# Patient Record
Sex: FEMALE | Race: BLACK OR AFRICAN AMERICAN | NOT HISPANIC OR LATINO | Employment: FULL TIME | URBAN - METROPOLITAN AREA
[De-identification: names, ages, dates, MRNs, and addresses within clinical notes are randomized per-mention and may not be internally consistent; named-entity substitution may affect disease eponyms.]

---

## 2023-11-01 PROBLEM — C78.7 COLON CANCER METASTASIZED TO LIVER (MULTI): Status: ACTIVE | Noted: 2023-11-01

## 2023-11-01 PROBLEM — K56.600 PARTIAL OBSTRUCTION OF SMALL INTESTINE (MULTI): Status: ACTIVE | Noted: 2023-11-01

## 2023-11-01 PROBLEM — K63.89 CECUM MASS: Status: ACTIVE | Noted: 2023-11-01

## 2023-11-01 PROBLEM — Z45.2 PIC LINE (PERIPHERALLY INSERTED CENTRAL CATHETER) REMOVAL: Status: ACTIVE | Noted: 2023-11-01

## 2023-11-01 PROBLEM — C18.9 COLON CANCER METASTASIZED TO LIVER (MULTI): Status: ACTIVE | Noted: 2023-11-01

## 2023-11-01 PROBLEM — Z93.1 STATUS POST INSERTION OF PERCUTANEOUS ENDOSCOPIC GASTROSTOMY (PEG) TUBE (MULTI): Status: ACTIVE | Noted: 2023-11-01

## 2023-11-01 RX ORDER — DULAGLUTIDE 0.75 MG/.5ML
INJECTION, SOLUTION SUBCUTANEOUS
COMMUNITY
End: 2024-05-07 | Stop reason: WASHOUT

## 2023-11-01 RX ORDER — TRAMADOL HYDROCHLORIDE 50 MG/1
TABLET ORAL
COMMUNITY
Start: 2020-07-15 | End: 2024-05-07 | Stop reason: WASHOUT

## 2023-11-01 RX ORDER — POLYETHYLENE GLYCOL 3350, SODIUM CHLORIDE, SODIUM BICARBONATE, POTASSIUM CHLORIDE 420; 11.2; 5.72; 1.48 G/4L; G/4L; G/4L; G/4L
POWDER, FOR SOLUTION ORAL
COMMUNITY
Start: 2022-01-11

## 2023-11-01 RX ORDER — ACETAMINOPHEN 325 MG/1
TABLET ORAL
COMMUNITY
Start: 2018-11-27

## 2023-11-02 ENCOUNTER — ANCILLARY PROCEDURE (OUTPATIENT)
Dept: RADIOLOGY | Facility: CLINIC | Age: 68
End: 2023-11-02
Payer: MEDICARE

## 2023-11-02 DIAGNOSIS — C18.9 MALIGNANT NEOPLASM OF COLON, UNSPECIFIED (MULTI): Primary | ICD-10-CM

## 2023-11-02 PROCEDURE — 82378 CARCINOEMBRYONIC ANTIGEN: CPT | Performed by: INTERNAL MEDICINE

## 2023-11-02 PROCEDURE — 71260 CT THORAX DX C+: CPT | Performed by: STUDENT IN AN ORGANIZED HEALTH CARE EDUCATION/TRAINING PROGRAM

## 2023-11-02 PROCEDURE — 74177 CT ABD & PELVIS W/CONTRAST: CPT

## 2023-11-02 PROCEDURE — 74177 CT ABD & PELVIS W/CONTRAST: CPT | Performed by: STUDENT IN AN ORGANIZED HEALTH CARE EDUCATION/TRAINING PROGRAM

## 2023-11-02 PROCEDURE — 2550000001 HC RX 255 CONTRASTS: Performed by: INTERNAL MEDICINE

## 2023-11-02 PROCEDURE — 82565 ASSAY OF CREATININE: CPT | Performed by: INTERNAL MEDICINE

## 2023-11-02 RX ADMIN — IOHEXOL 75 ML: 350 INJECTION, SOLUTION INTRAVENOUS at 11:34

## 2023-11-07 ENCOUNTER — OFFICE VISIT (OUTPATIENT)
Dept: HEMATOLOGY/ONCOLOGY | Facility: CLINIC | Age: 68
End: 2023-11-07
Payer: MEDICARE

## 2023-11-07 VITALS
BODY MASS INDEX: 41.57 KG/M2 | HEART RATE: 74 BPM | SYSTOLIC BLOOD PRESSURE: 124 MMHG | DIASTOLIC BLOOD PRESSURE: 75 MMHG | WEIGHT: 249.78 LBS | TEMPERATURE: 97.3 F | OXYGEN SATURATION: 96 % | RESPIRATION RATE: 18 BRPM

## 2023-11-07 DIAGNOSIS — C18.9 COLON CANCER METASTASIZED TO LIVER (MULTI): Primary | ICD-10-CM

## 2023-11-07 DIAGNOSIS — C78.7 COLON CANCER METASTASIZED TO LIVER (MULTI): Primary | ICD-10-CM

## 2023-11-07 PROCEDURE — 99213 OFFICE O/P EST LOW 20 MIN: CPT | Performed by: INTERNAL MEDICINE

## 2023-11-07 PROCEDURE — 1036F TOBACCO NON-USER: CPT | Performed by: INTERNAL MEDICINE

## 2023-11-07 PROCEDURE — 1126F AMNT PAIN NOTED NONE PRSNT: CPT | Performed by: INTERNAL MEDICINE

## 2023-11-07 ASSESSMENT — ENCOUNTER SYMPTOMS
DEPRESSION: 0
OCCASIONAL FEELINGS OF UNSTEADINESS: 0
LOSS OF SENSATION IN FEET: 0

## 2023-11-07 ASSESSMENT — PAIN SCALES - GENERAL: PAINLEVEL: 0-NO PAIN

## 2023-11-07 NOTE — PROGRESS NOTES
Patient ID: Mabel Cardoso is a 68 y.o. female from [place], [place].     Referring Physician: No referring provider defined for this encounter.    Primary Care Provider: Donna Leos    Cancer History:   Treatment Synopsis:    DIAGNOSIS  5/18/19 T3N0M0 right-sided colon cancer, s/p resection  11/2019 Liver metastasis bx proven  6/2020- LIver bx of smaller lesion negative  6/30/2020 R lobe hepatectomy     CURRENT SITES OF DISEASE  Liver- > BECKIE      MOLECULAR GENOMICS:  MICROSATELLITE STATUS: Indeterminate   DISEASE ASSOCIATED GENOMIC FINDINGS:   TP53 p.R248W ([id number]: c.742C>T)       DISEASERELEVANT ALTERATIONS NOT DETECTED:    Negative for BRAF V600E.   Negative for KRAS mutation.   Negative for NRAS mutation.        SERUM TUMOR MARKER  CEA low at dx  6.9 at recurrence 10/2019  3.0 1/2020     CURRENT THERAPY  Surveillance    PRIOR THERAPY  R. Hemicolectomy 5/2018  Developed liver met 11/19: 12/19-4/2020cape/ox, bevacizumab - oxaliplatin / jarad held starting 4/28/20 -      Liver resection 6/30/20          Past Medical History: Mabel has a past medical history of Other specified health status, Personal history of other diseases of the circulatory system (07/15/2020), and Personal history of other endocrine, nutritional and metabolic disease (07/15/2020).  Surgical History:  Mabel has a past surgical history that includes Breast surgery (04/09/2018); Hysterectomy (04/09/2018); Knee surgery (04/09/2018); Other surgical history (07/15/2020); US guided needle liver biopsy (10/18/2019); and US guided needle liver biopsy (6/5/2020).  Social History:  Mabel reports that she has never smoked. She has never used smokeless tobacco. She reports that she does not currently use alcohol. She reports that she does not use drugs.  Family History:    Family History   Problem Relation Name Age of Onset    Diabetes Mother      Hypertension Mother      Lung cancer Mother      Lung cancer Father       Family Oncology  History:  Cancer-related family history includes Lung cancer in her father and mother.        SUBJECTIVE:    History of Present Illness:  Mabel Cardoso is a 68 y.o. female who was referred by No ref. provider found and presents with Follow-up    Feeling good.    Down 10 lbs on ozempic in last 6months.    She is wondering about if she can come of Farxiga.      OBJECTIVE:    VS / Pain:  /75 (BP Location: Left arm, Patient Position: Sitting, BP Cuff Size: Adult)   Pulse 74   Temp 36.3 °C (97.3 °F) (Temporal)   Resp 18   Wt 113 kg (249 lb 12.5 oz)   SpO2 96%   BMI 41.57 kg/m²   BSA: 2.28 meters squared   Pain Scale: 0    Daily Weight  11/07/23 : 113 kg (249 lb 12.5 oz)  03/03/23 : 117 kg (258 lb 1.6 oz)  09/20/22 : 128 kg (282 lb 6.6 oz)      Physical Exam  Constitutional:       General: She is not in acute distress.     Appearance: She is obese.   HENT:      Head: Normocephalic.      Mouth/Throat:      Mouth: Mucous membranes are moist.   Eyes:      Pupils: Pupils are equal, round, and reactive to light.   Cardiovascular:      Rate and Rhythm: Normal rate.   Pulmonary:      Effort: Pulmonary effort is normal.   Abdominal:      General: Abdomen is flat.   Musculoskeletal:         General: Normal range of motion.      Cervical back: Normal range of motion.   Skin:     General: Skin is warm.   Neurological:      General: No focal deficit present.      Mental Status: She is alert. Mental status is at baseline.   Psychiatric:         Mood and Affect: Mood normal.         Thought Content: Thought content normal.         Performance Status:   Ecog 1    Diagnostic Results         WBC   Date/Time Value Ref Range Status   11/02/2023 10:27 AM 11.6 (H) 4.4 - 11.3 x10*3/uL Final   07/14/2023 04:13 PM 11.8 (H) 4.4 - 11.3 x10E9/L Final   01/18/2023 04:16 PM 13.2 (H) 4.4 - 11.3 x10E9/L Final   09/16/2022 10:26 AM 9.0 4.4 - 11.3 x10E9/L Final     Hemoglobin   Date Value Ref Range Status   11/02/2023 13.8 12.0 - 16.0  g/dL Final   07/14/2023 14.3 12.0 - 16.0 g/dL Final   01/18/2023 14.0 12.0 - 16.0 g/dL Final   09/16/2022 13.8 12.0 - 16.0 g/dL Final     MCV   Date/Time Value Ref Range Status   11/02/2023 10:27 AM 94 80 - 100 fL Final   07/14/2023 04:13 PM 89 80 - 100 fL Final   01/18/2023 04:16 PM 90 80 - 100 fL Final   09/16/2022 10:26 AM 90 80 - 100 fL Final     Platelets   Date/Time Value Ref Range Status   11/02/2023 10:27  150 - 450 x10*3/uL Final   07/14/2023 04:13  150 - 450 x10E9/L Final   01/18/2023 04:16  150 - 450 x10E9/L Final   09/16/2022 10:26  150 - 450 x10E9/L Final     Neutrophils Absolute   Date/Time Value Ref Range Status   11/02/2023 10:27 AM 6.06 1.20 - 7.70 x10*3/uL Final     Comment:     Percent differential counts (%) should be interpreted in the context of the absolute cell counts (cells/uL).   07/14/2023 04:13 PM 6.05 1.20 - 7.70 x10E9/L Final     Comment:      Percent differential counts (%) should be interpreted in the   context of the absolute cell counts (cells/L).     01/18/2023 04:16 PM 7.18 1.20 - 7.70 x10E9/L Final     Comment:      Percent differential counts (%) should be interpreted in the   context of the absolute cell counts (cells/L).     09/16/2022 10:26 AM 4.92 1.20 - 7.70 x10E9/L Final     Comment:      Percent differential counts (%) should be interpreted in the   context of the absolute cell counts (cells/L).       Bilirubin, Total   Date/Time Value Ref Range Status   11/02/2023 10:27 AM 0.5 0.0 - 1.2 mg/dL Final     Total Bilirubin   Date/Time Value Ref Range Status   07/14/2023 04:13 PM 0.5 0.0 - 1.2 mg/dL Final   01/18/2023 04:15 PM 0.5 0.0 - 1.2 mg/dL Final   09/16/2022 10:26 AM 0.5 0.0 - 1.2 mg/dL Final     AST   Date/Time Value Ref Range Status   11/02/2023 10:27 AM 18 9 - 39 U/L Final   07/14/2023 04:13 PM 16 9 - 39 U/L Final   01/18/2023 04:15 PM 15 9 - 39 U/L Final   09/16/2022 10:26 AM 20 9 - 39 U/L Final     ALT   Date/Time Value Ref Range Status  "  11/02/2023 10:27 AM 20 7 - 45 U/L Final     Comment:     Patients treated with Sulfasalazine may generate falsely decreased results for ALT.     ALT (SGPT)   Date/Time Value Ref Range Status   07/14/2023 04:13 PM 16 7 - 45 U/L Final     Comment:      Patients treated with Sulfasalazine may generate    falsely decreased results for ALT.     01/18/2023 04:15 PM 19 7 - 45 U/L Final     Comment:      Patients treated with Sulfasalazine may generate    falsely decreased results for ALT.     09/16/2022 10:26 AM 24 7 - 45 U/L Final     Comment:      Patients treated with Sulfasalazine may generate    falsely decreased results for ALT.       Creatinine   Date/Time Value Ref Range Status   11/02/2023 10:27 AM 0.51 0.50 - 1.05 mg/dL Final   07/14/2023 04:13 PM 0.65 0.50 - 1.05 mg/dL Final   07/14/2023 04:13 PM 0.60 0.60 - 1.30 mg/dL Final     Comment:     Hydroxyurea can cause significant interference with creatinine measurement   using the i-STAT device. An alternate method of creatinine measurement must   be used in patients treated with hydroxyurea.      01/18/2023 04:15 PM 0.69 0.50 - 1.05 mg/dL Final     Urea Nitrogen   Date/Time Value Ref Range Status   11/02/2023 10:27 AM 11 6 - 23 mg/dL Final   07/14/2023 04:13 PM 16 6 - 23 mg/dL Final   01/18/2023 04:15 PM 11 6 - 23 mg/dL Final   09/16/2022 10:26 AM 10 6 - 23 mg/dL Final     Albumin   Date/Time Value Ref Range Status   11/02/2023 10:27 AM 4.3 3.4 - 5.0 g/dL Final   07/14/2023 04:13 PM 4.6 3.4 - 5.0 g/dL Final   01/18/2023 04:15 PM 4.3 3.4 - 5.0 g/dL Final   09/16/2022 10:26 AM 4.2 3.4 - 5.0 g/dL Final     No results found for: \"\"  Carcinoembryonic AG   Date/Time Value Ref Range Status   11/02/2023 10:27 AM 0.5 ug/L Final     === 11/02/23 ===    CT CHEST ABDOMEN PELVIS W IV CONTRAST    - Impression -  CHEST:  1. No intrathoracic metastatic disease.    ABDOMEN-PELVIS:  1. Status post right hemicolectomy with no locoregional recurrence at  the ileocolic " anastomosis or metastatic disease in the abdomen or  pelvis.  2. Status post partial right hepatectomy with no local recurrence.  3. Hepatic steatosis.      MACRO:  None    Signed by: Lars Mccrary 11/3/2023 1:50 PM  Dictation workstation:   [id number]      Assessment/Plan     Assessment:    Ms. Cardoso is a 68 y.o.  F with Stage II (T3N0M0) tumor with no high-risk features, s/p resection 5/3/18. She had a complicated post-op course due to prolonged ileus,  requiring NGT and PICC. She did not receive adjuvant therapy, and she subsequently developed SBO and hernia s/p OR on 11/4/18 for lysis of adhesio and internal hernia repair.      Followed by surveillance  - found to have a rising cea level Sept 2019 - 4.9.  Repeated on 10/1/19 - was again elevated at 6.8.  10/9/19 - ct scan showed 2 liver lesions c/w metastatic disease.   Bx + for adenocarcinoma cw colon primary.      11/5/19 - started chemotherapy with capeox.     11/26/19 - avastin added with cycle #2.      1/2020 scans show response in liver.     4/21/2020 - ct scans showing stable disease - referred to Dr. Mendes for surgical resection  - he discussed ablation followed by possible right hepatectomy.  6/5/2020- IR consult for ablation, lesion planned for ablation bx negative for cancer, fat only.    6/30/20- R lobe hepatectomy.  METASTATIC COLONIC ADENOCARCINOMA INVOLVING LIVER --BACKGROUND LIVER WITH MODERATE STEATOSIS Note: The resection margin is free of tumor.    - followed by surveillance since that time. Doing well.  CEA normal - no concerning symptoms.   1/2022, 9/2022, 10/2023 CT scan negative for recurrence/metastasis.        Plan:   Metastatic CRC now BECKIE s/p R liver hepatectomy 6/2020.  -   Continue with Surveillance regimen    - Port removed.   - next colonoscopy due May 2025   - Repeat CT C/A/P 6 months with follow up after.    - Recommended exercise 30 min per day 5 days per week.   - These should be at least q4-6 months based on current  recommendations.  WIll obtain next in ~10/2023 ~9 months after last scan.      Weight loss:  -Planned, on ozempic     Hypercalcemia:  -Check PTH     Hypertension:  - F/U with PCP Dr. Donna Leos ([facility])     Social -    -- referral to social work for billing, reimbursement issues, following with Selam Campbell MD

## 2024-05-03 ENCOUNTER — APPOINTMENT (OUTPATIENT)
Dept: RADIOLOGY | Facility: CLINIC | Age: 69
End: 2024-05-03
Payer: MEDICARE

## 2024-05-03 ENCOUNTER — APPOINTMENT (OUTPATIENT)
Dept: HEMATOLOGY/ONCOLOGY | Facility: CLINIC | Age: 69
End: 2024-05-03
Payer: MEDICARE

## 2024-05-03 DIAGNOSIS — C78.7 COLON CANCER METASTASIZED TO LIVER (MULTI): Primary | ICD-10-CM

## 2024-05-03 DIAGNOSIS — C18.9 COLON CANCER METASTASIZED TO LIVER (MULTI): Primary | ICD-10-CM

## 2024-05-06 ENCOUNTER — HOSPITAL ENCOUNTER (OUTPATIENT)
Dept: RADIOLOGY | Facility: CLINIC | Age: 69
Discharge: HOME | End: 2024-05-06
Payer: MEDICARE

## 2024-05-06 ENCOUNTER — LAB (OUTPATIENT)
Dept: LAB | Facility: CLINIC | Age: 69
End: 2024-05-06
Payer: MEDICARE

## 2024-05-06 DIAGNOSIS — C78.7 COLON CANCER METASTASIZED TO LIVER (MULTI): ICD-10-CM

## 2024-05-06 DIAGNOSIS — C18.9 COLON CANCER METASTASIZED TO LIVER (MULTI): Primary | ICD-10-CM

## 2024-05-06 DIAGNOSIS — C18.9 COLON CANCER METASTASIZED TO LIVER (MULTI): ICD-10-CM

## 2024-05-06 DIAGNOSIS — C78.7 COLON CANCER METASTASIZED TO LIVER (MULTI): Primary | ICD-10-CM

## 2024-05-06 LAB
ALBUMIN SERPL BCP-MCNC: 4.1 G/DL (ref 3.4–5)
ALP SERPL-CCNC: 143 U/L (ref 33–136)
ALT SERPL W P-5'-P-CCNC: 29 U/L (ref 7–45)
ANION GAP SERPL CALC-SCNC: 11 MMOL/L (ref 10–20)
ANION GAP SERPL CALC-SCNC: 15 MMOL/L (ref 10–20)
AST SERPL W P-5'-P-CCNC: 22 U/L (ref 9–39)
BASOPHILS # BLD AUTO: 0.03 X10*3/UL (ref 0–0.1)
BASOPHILS NFR BLD AUTO: 0.3 %
BILIRUB SERPL-MCNC: 0.7 MG/DL (ref 0–1.2)
BUN SERPL-MCNC: 6 MG/DL (ref 6–23)
BUN SERPL-MCNC: 8 MG/DL (ref 6–23)
CA-I BLD-SCNC: 1.23 MMOL/L (ref 1.1–1.33)
CALCIUM SERPL-MCNC: 9.7 MG/DL (ref 8.6–10.6)
CEA SERPL-MCNC: <0.5 UG/L
CHLORIDE SERPL-SCNC: 105 MMOL/L (ref 98–107)
CHLORIDE SERPL-SCNC: 105 MMOL/L (ref 98–107)
CO2 SERPL-SCNC: 23 MMOL/L (ref 21–32)
CO2 SERPL-SCNC: 28 MMOL/L (ref 21–32)
CREAT SERPL-MCNC: 0.7 MG/DL (ref 0.6–1.3)
CREAT SERPL-MCNC: 0.76 MG/DL (ref 0.5–1.05)
EGFRCR SERPLBLD CKD-EPI 2021: 85 ML/MIN/1.73M*2
EOSINOPHIL # BLD AUTO: 0.28 X10*3/UL (ref 0–0.7)
EOSINOPHIL NFR BLD AUTO: 2.3 %
ERYTHROCYTE [DISTWIDTH] IN BLOOD BY AUTOMATED COUNT: 14.3 % (ref 11.5–14.5)
GFR SERPL CREATININE-BSD FRML MDRD: >90 ML/MIN/1.73M*2
GLUCOSE SERPL-MCNC: 87 MG/DL (ref 74–99)
GLUCOSE SERPL-MCNC: 97 MG/DL (ref 74–99)
HCT VFR BLD AUTO: 42.1 % (ref 36–46)
HGB BLD-MCNC: 13.4 G/DL (ref 12–16)
IMM GRANULOCYTES # BLD AUTO: 0.03 X10*3/UL (ref 0–0.7)
IMM GRANULOCYTES NFR BLD AUTO: 0.3 % (ref 0–0.9)
LYMPHOCYTES # BLD AUTO: 4.21 X10*3/UL (ref 1.2–4.8)
LYMPHOCYTES NFR BLD AUTO: 35.2 %
MCH RBC QN AUTO: 29.6 PG (ref 26–34)
MCHC RBC AUTO-ENTMCNC: 31.8 G/DL (ref 32–36)
MCV RBC AUTO: 93 FL (ref 80–100)
MONOCYTES # BLD AUTO: 0.82 X10*3/UL (ref 0.1–1)
MONOCYTES NFR BLD AUTO: 6.9 %
NEUTROPHILS # BLD AUTO: 6.58 X10*3/UL (ref 1.2–7.7)
NEUTROPHILS NFR BLD AUTO: 55 %
NRBC BLD-RTO: ABNORMAL /100{WBCS}
PLATELET # BLD AUTO: 260 X10*3/UL (ref 150–450)
POTASSIUM SERPL-SCNC: 3.8 MMOL/L (ref 3.5–5.3)
POTASSIUM SERPL-SCNC: 4.1 MMOL/L (ref 3.5–5.3)
PROT SERPL-MCNC: 7 G/DL (ref 6.4–8.2)
RBC # BLD AUTO: 4.52 X10*6/UL (ref 4–5.2)
RBC MORPH BLD: NORMAL
SODIUM SERPL-SCNC: 139 MMOL/L (ref 136–145)
SODIUM SERPL-SCNC: 140 MMOL/L (ref 136–145)
WBC # BLD AUTO: 12 X10*3/UL (ref 4.4–11.3)

## 2024-05-06 PROCEDURE — 74177 CT ABD & PELVIS W/CONTRAST: CPT | Performed by: RADIOLOGY

## 2024-05-06 PROCEDURE — 2550000001 HC RX 255 CONTRASTS: Performed by: INTERNAL MEDICINE

## 2024-05-06 PROCEDURE — 85025 COMPLETE CBC W/AUTO DIFF WBC: CPT

## 2024-05-06 PROCEDURE — 74177 CT ABD & PELVIS W/CONTRAST: CPT

## 2024-05-06 PROCEDURE — 82378 CARCINOEMBRYONIC ANTIGEN: CPT

## 2024-05-06 PROCEDURE — 71260 CT THORAX DX C+: CPT | Performed by: RADIOLOGY

## 2024-05-06 PROCEDURE — 80053 COMPREHEN METABOLIC PANEL: CPT

## 2024-05-06 PROCEDURE — 80047 BASIC METABLC PNL IONIZED CA: CPT | Mod: 59 | Performed by: INTERNAL MEDICINE

## 2024-05-06 PROCEDURE — 36415 COLL VENOUS BLD VENIPUNCTURE: CPT

## 2024-05-06 RX ADMIN — IOHEXOL 75 ML: 350 INJECTION, SOLUTION INTRAVENOUS at 16:45

## 2024-05-07 ENCOUNTER — APPOINTMENT (OUTPATIENT)
Dept: HEMATOLOGY/ONCOLOGY | Facility: CLINIC | Age: 69
End: 2024-05-07
Payer: MEDICARE

## 2024-05-07 ENCOUNTER — OFFICE VISIT (OUTPATIENT)
Dept: HEMATOLOGY/ONCOLOGY | Facility: CLINIC | Age: 69
End: 2024-05-07
Payer: MEDICARE

## 2024-05-07 ENCOUNTER — APPOINTMENT (OUTPATIENT)
Dept: RADIOLOGY | Facility: CLINIC | Age: 69
End: 2024-05-07
Payer: MEDICARE

## 2024-05-07 VITALS
TEMPERATURE: 97.3 F | SYSTOLIC BLOOD PRESSURE: 106 MMHG | BODY MASS INDEX: 42.01 KG/M2 | RESPIRATION RATE: 18 BRPM | HEART RATE: 71 BPM | OXYGEN SATURATION: 95 % | DIASTOLIC BLOOD PRESSURE: 66 MMHG | WEIGHT: 252.43 LBS

## 2024-05-07 DIAGNOSIS — C78.7 COLON CANCER METASTASIZED TO LIVER (MULTI): ICD-10-CM

## 2024-05-07 DIAGNOSIS — C18.9 COLON CANCER METASTASIZED TO LIVER (MULTI): ICD-10-CM

## 2024-05-07 PROCEDURE — 99214 OFFICE O/P EST MOD 30 MIN: CPT | Performed by: INTERNAL MEDICINE

## 2024-05-07 PROCEDURE — 1159F MED LIST DOCD IN RCRD: CPT | Performed by: INTERNAL MEDICINE

## 2024-05-07 PROCEDURE — 1126F AMNT PAIN NOTED NONE PRSNT: CPT | Performed by: INTERNAL MEDICINE

## 2024-05-07 RX ORDER — SEMAGLUTIDE 2.68 MG/ML
2 INJECTION, SOLUTION SUBCUTANEOUS
COMMUNITY

## 2024-05-07 RX ORDER — DAPAGLIFLOZIN 10 MG/1
1 TABLET, FILM COATED ORAL
COMMUNITY
Start: 2023-07-24

## 2024-05-07 RX ORDER — POTASSIUM CHLORIDE 600 MG/1
8 TABLET, FILM COATED, EXTENDED RELEASE ORAL DAILY
COMMUNITY

## 2024-05-07 ASSESSMENT — PAIN SCALES - GENERAL: PAINLEVEL: 0-NO PAIN

## 2024-05-07 NOTE — PROGRESS NOTES
Patient ID: Mabel Cardoso is a 69 y.o. female from [place], [place].     Referring Physician: No referring provider defined for this encounter.    Primary Care Provider: Donna Leos    Cancer History:   Treatment Synopsis:    DIAGNOSIS  5/18/19 T3N0M0 right-sided colon cancer, s/p resection  11/2019 Liver metastasis bx proven  6/2020- LIver bx of smaller lesion negative  6/30/2020 R lobe hepatectomy     CURRENT SITES OF DISEASE  Liver- > BECKIE      MOLECULAR GENOMICS:  MICROSATELLITE STATUS: Indeterminate   DISEASE ASSOCIATED GENOMIC FINDINGS:   TP53 p.R248W ([id number]: c.742C>T)       DISEASERELEVANT ALTERATIONS NOT DETECTED:    Negative for BRAF V600E.   Negative for KRAS mutation.   Negative for NRAS mutation.        SERUM TUMOR MARKER  CEA low at dx  6.9 at recurrence 10/2019  3.0 1/2020     CURRENT THERAPY  Surveillance    PRIOR THERAPY  R. Hemicolectomy 5/2018  Developed liver met 11/19: 12/19-4/2020cape/ox, bevacizumab - oxaliplatin / jarad held starting 4/28/20 -      Liver resection 6/30/20     Past Medical History: Mabel has a past medical history of Other specified health status, Personal history of other diseases of the circulatory system (07/15/2020), and Personal history of other endocrine, nutritional and metabolic disease (07/15/2020).  Surgical History:  Mabel has a past surgical history that includes Breast surgery (04/09/2018); Hysterectomy (04/09/2018); Knee surgery (04/09/2018); Other surgical history (07/15/2020); US guided needle liver biopsy (10/18/2019); and US guided needle liver biopsy (6/5/2020).  Social History:  Mabel reports that she has never smoked. She has never used smokeless tobacco. She reports that she does not currently use alcohol. She reports that she does not use drugs.  Family History:    Family History   Problem Relation Name Age of Onset    Diabetes Mother      Hypertension Mother      Lung cancer Mother      Lung cancer Father       Family Oncology History:   Cancer-related family history includes Lung cancer in her father and mother.        SUBJECTIVE:    History of Present Illness:  Mabel Cardoso is a 69 y.o. female who was referred by No ref. provider found and presents with Follow-up    Feeling good.    Down 10 lbs on ozempic in last 6months.    She is wondering about if she can come of Farxiga.    OBJECTIVE:    VS / Pain:  /66   Pulse 71   Temp 36.3 °C (97.3 °F)   Resp 18   Wt 115 kg (252 lb 6.8 oz)   SpO2 95%   BMI 42.01 kg/m²   BSA: 2.3 meters squared   Pain Scale: 0    Daily Weight  05/07/24 : 115 kg (252 lb 6.8 oz)  11/07/23 : 113 kg (249 lb 12.5 oz)  03/03/23 : 117 kg (258 lb 1.6 oz)      Physical Exam  Constitutional:       General: She is not in acute distress.     Appearance: She is obese.   HENT:      Head: Normocephalic.      Mouth/Throat:      Mouth: Mucous membranes are moist.   Eyes:      Pupils: Pupils are equal, round, and reactive to light.   Cardiovascular:      Rate and Rhythm: Normal rate.   Pulmonary:      Effort: Pulmonary effort is normal.   Abdominal:      General: Abdomen is flat.   Musculoskeletal:         General: Normal range of motion.      Cervical back: Normal range of motion.   Skin:     General: Skin is warm.   Neurological:      General: No focal deficit present.      Mental Status: She is alert. Mental status is at baseline.   Psychiatric:         Mood and Affect: Mood normal.         Thought Content: Thought content normal.       Performance Status:   Ecog 1    Diagnostic Results         WBC   Date/Time Value Ref Range Status   05/06/2024 04:04 PM 12.0 (H) 4.4 - 11.3 x10*3/uL Final   11/02/2023 10:27 AM 11.6 (H) 4.4 - 11.3 x10*3/uL Final   07/14/2023 04:13 PM 11.8 (H) 4.4 - 11.3 x10E9/L Final   01/18/2023 04:16 PM 13.2 (H) 4.4 - 11.3 x10E9/L Final   09/16/2022 10:26 AM 9.0 4.4 - 11.3 x10E9/L Final     Hemoglobin   Date Value Ref Range Status   05/06/2024 13.4 12.0 - 16.0 g/dL Final   11/02/2023 13.8 12.0 - 16.0  g/dL Final   07/14/2023 14.3 12.0 - 16.0 g/dL Final   01/18/2023 14.0 12.0 - 16.0 g/dL Final   09/16/2022 13.8 12.0 - 16.0 g/dL Final     MCV   Date/Time Value Ref Range Status   05/06/2024 04:04 PM 93 80 - 100 fL Final   11/02/2023 10:27 AM 94 80 - 100 fL Final   07/14/2023 04:13 PM 89 80 - 100 fL Final   01/18/2023 04:16 PM 90 80 - 100 fL Final   09/16/2022 10:26 AM 90 80 - 100 fL Final     Platelets   Date/Time Value Ref Range Status   05/06/2024 04:04  150 - 450 x10*3/uL Final     Comment:     Platelet count verified by smear review.   11/02/2023 10:27  150 - 450 x10*3/uL Final   07/14/2023 04:13  150 - 450 x10E9/L Final   01/18/2023 04:16  150 - 450 x10E9/L Final   09/16/2022 10:26  150 - 450 x10E9/L Final     Neutrophils Absolute   Date/Time Value Ref Range Status   05/06/2024 04:04 PM 6.58 1.20 - 7.70 x10*3/uL Final     Comment:     Percent differential counts (%) should be interpreted in the context of the absolute cell counts (cells/uL).   11/02/2023 10:27 AM 6.06 1.20 - 7.70 x10*3/uL Final     Comment:     Percent differential counts (%) should be interpreted in the context of the absolute cell counts (cells/uL).   07/14/2023 04:13 PM 6.05 1.20 - 7.70 x10E9/L Final     Comment:      Percent differential counts (%) should be interpreted in the   context of the absolute cell counts (cells/L).     01/18/2023 04:16 PM 7.18 1.20 - 7.70 x10E9/L Final     Comment:      Percent differential counts (%) should be interpreted in the   context of the absolute cell counts (cells/L).     09/16/2022 10:26 AM 4.92 1.20 - 7.70 x10E9/L Final     Comment:      Percent differential counts (%) should be interpreted in the   context of the absolute cell counts (cells/L).       Bilirubin, Total   Date/Time Value Ref Range Status   05/06/2024 04:04 PM 0.7 0.0 - 1.2 mg/dL Final   11/02/2023 10:27 AM 0.5 0.0 - 1.2 mg/dL Final     Total Bilirubin   Date/Time Value Ref Range Status   07/14/2023 04:13 PM  0.5 0.0 - 1.2 mg/dL Final   01/18/2023 04:15 PM 0.5 0.0 - 1.2 mg/dL Final   09/16/2022 10:26 AM 0.5 0.0 - 1.2 mg/dL Final     AST   Date/Time Value Ref Range Status   05/06/2024 04:04 PM 22 9 - 39 U/L Final   11/02/2023 10:27 AM 18 9 - 39 U/L Final   07/14/2023 04:13 PM 16 9 - 39 U/L Final   01/18/2023 04:15 PM 15 9 - 39 U/L Final   09/16/2022 10:26 AM 20 9 - 39 U/L Final     ALT   Date/Time Value Ref Range Status   05/06/2024 04:04 PM 29 7 - 45 U/L Final     Comment:     Patients treated with Sulfasalazine may generate falsely decreased results for ALT.   11/02/2023 10:27 AM 20 7 - 45 U/L Final     Comment:     Patients treated with Sulfasalazine may generate falsely decreased results for ALT.     ALT (SGPT)   Date/Time Value Ref Range Status   07/14/2023 04:13 PM 16 7 - 45 U/L Final     Comment:      Patients treated with Sulfasalazine may generate    falsely decreased results for ALT.     01/18/2023 04:15 PM 19 7 - 45 U/L Final     Comment:      Patients treated with Sulfasalazine may generate    falsely decreased results for ALT.     09/16/2022 10:26 AM 24 7 - 45 U/L Final     Comment:      Patients treated with Sulfasalazine may generate    falsely decreased results for ALT.       Creatinine   Date/Time Value Ref Range Status   05/06/2024 04:04 PM 0.76 0.50 - 1.05 mg/dL Final   11/02/2023 10:27 AM 0.51 0.50 - 1.05 mg/dL Final   07/14/2023 04:13 PM 0.65 0.50 - 1.05 mg/dL Final   07/14/2023 04:13 PM 0.60 0.60 - 1.30 mg/dL Final     Comment:     Hydroxyurea can cause significant interference with creatinine measurement   using the i-STAT device. An alternate method of creatinine measurement must   be used in patients treated with hydroxyurea.      01/18/2023 04:15 PM 0.69 0.50 - 1.05 mg/dL Final     Urea Nitrogen   Date/Time Value Ref Range Status   05/06/2024 04:04 PM 8 6 - 23 mg/dL Final   11/02/2023 10:27 AM 11 6 - 23 mg/dL Final   07/14/2023 04:13 PM 16 6 - 23 mg/dL Final   01/18/2023 04:15 PM 11 6 - 23 mg/dL  "Final   09/16/2022 10:26 AM 10 6 - 23 mg/dL Final     Albumin   Date/Time Value Ref Range Status   05/06/2024 04:04 PM 4.1 3.4 - 5.0 g/dL Final   11/02/2023 10:27 AM 4.3 3.4 - 5.0 g/dL Final   07/14/2023 04:13 PM 4.6 3.4 - 5.0 g/dL Final   01/18/2023 04:15 PM 4.3 3.4 - 5.0 g/dL Final   09/16/2022 10:26 AM 4.2 3.4 - 5.0 g/dL Final     No results found for: \"\"  Carcinoembryonic AG   Date/Time Value Ref Range Status   05/06/2024 04:04 PM <0.5 ug/L Final   11/02/2023 10:27 AM 0.5 ug/L Final   === 11/02/23 ===    CT CHEST ABDOMEN PELVIS W IV CONTRAST    - Impression -  CHEST:  1. No intrathoracic metastatic disease.    ABDOMEN-PELVIS:  1. Status post right hemicolectomy with no locoregional recurrence at  the ileocolic anastomosis or metastatic disease in the abdomen or  pelvis.  2. Status post partial right hepatectomy with no local recurrence.  3. Hepatic steatosis.      MACRO:  None    Signed by: Lars Mccrary 11/3/2023 1:50 PM  Dictation workstation:   [id number]     CT scan from May 6, 2024 not yet read by radiology however I have reviewed the images completely.  There is a new 2 to 3 mm pulmonary nodule in the left lower lobe.  There is an associated 1 mm punctate nodule adjacent to the 2 to 3 mm nodule.  These appear consistent with inflammatory changes although are too small to completely characterize and should be followed up with short interval imaging in 3 months.    Assessment/Plan     Assessment:    Ms. Cardoso is a 69 y.o.  F with Stage II (T3N0M0) tumor with no high-risk features, s/p resection 5/3/18. She had a complicated post-op course due to prolonged ileus,  requiring NGT and PICC. She did not receive adjuvant therapy, and she subsequently developed SBO and hernia s/p OR on 11/4/18 for lysis of adhesio and internal hernia repair.      Followed by surveillance  - found to have a rising cea level Sept 2019 - 4.9.  Repeated on 10/1/19 - was again elevated at 6.8.  10/9/19 - ct scan showed 2 liver " lesions c/w metastatic disease.   Bx + for adenocarcinoma cw colon primary.      11/5/19 - started chemotherapy with capeox.     11/26/19 - avastin added with cycle #2.      1/2020 scans show response in liver.     4/21/2020 - ct scans showing stable disease - referred to Dr. Mendes for surgical resection  - he discussed ablation followed by possible right hepatectomy.  6/5/2020- IR consult for ablation, lesion planned for ablation bx negative for cancer, fat only.    6/30/20- R lobe hepatectomy.  METASTATIC COLONIC ADENOCARCINOMA INVOLVING LIVER --BACKGROUND LIVER WITH MODERATE STEATOSIS Note: The resection margin is free of tumor.    - followed by surveillance since that time. Doing well.  CEA normal - no concerning symptoms.   1/2022, 9/2022, 10/2023 CT scan negative for recurrence/metastasis.        Plan:   Metastatic CRC now BECKIE s/p R liver hepatectomy 6/2020.  -   Continue with Surveillance regimen    - Port removed.   - next colonoscopy due May 2025   - Recommended exercise 30 min per day 5 days per week.    - New L side pulm nodule will need early interval CT of chest in 3 months 8/2024.    - Full CT C/a/P will be needed at least q4-6 months based on current recommendations.  WIll obtain next in ~11/12024 or so.       Weight loss:  -Planned, on ozempic     Hypercalcemia:  -Check PTH     Hypertension:  - F/U with PCP Dr. Donna Leos ([facility])     Social -    -- referral to social work for billing, reimbursement issues, following with Selam Campbell MD

## 2024-07-31 ENCOUNTER — TELEPHONE (OUTPATIENT)
Dept: HEMATOLOGY/ONCOLOGY | Facility: CLINIC | Age: 69
End: 2024-07-31
Payer: MEDICARE

## 2024-08-01 ENCOUNTER — HOSPITAL ENCOUNTER (OUTPATIENT)
Dept: RADIOLOGY | Facility: CLINIC | Age: 69
Discharge: HOME | End: 2024-08-01
Payer: MEDICARE

## 2024-08-01 ENCOUNTER — LAB (OUTPATIENT)
Dept: LAB | Facility: CLINIC | Age: 69
End: 2024-08-01
Payer: MEDICARE

## 2024-08-01 DIAGNOSIS — C18.9 COLON CANCER METASTASIZED TO LIVER (MULTI): ICD-10-CM

## 2024-08-01 DIAGNOSIS — C78.7 COLON CANCER METASTASIZED TO LIVER (MULTI): ICD-10-CM

## 2024-08-01 LAB
ALBUMIN SERPL BCP-MCNC: 4.4 G/DL (ref 3.4–5)
ALP SERPL-CCNC: 150 U/L (ref 33–136)
ALT SERPL W P-5'-P-CCNC: 18 U/L (ref 7–45)
ANION GAP SERPL CALC-SCNC: 12 MMOL/L (ref 10–20)
AST SERPL W P-5'-P-CCNC: 19 U/L (ref 9–39)
BASOPHILS # BLD AUTO: 0.04 X10*3/UL (ref 0–0.1)
BASOPHILS NFR BLD AUTO: 0.3 %
BILIRUB SERPL-MCNC: 0.7 MG/DL (ref 0–1.2)
BUN SERPL-MCNC: 10 MG/DL (ref 6–23)
CALCIUM SERPL-MCNC: 10.2 MG/DL (ref 8.6–10.6)
CEA SERPL-MCNC: <0.5 UG/L
CHLORIDE SERPL-SCNC: 103 MMOL/L (ref 98–107)
CO2 SERPL-SCNC: 27 MMOL/L (ref 21–32)
CREAT SERPL-MCNC: 0.58 MG/DL (ref 0.5–1.05)
EGFRCR SERPLBLD CKD-EPI 2021: >90 ML/MIN/1.73M*2
EOSINOPHIL # BLD AUTO: 0.24 X10*3/UL (ref 0–0.7)
EOSINOPHIL NFR BLD AUTO: 1.8 %
ERYTHROCYTE [DISTWIDTH] IN BLOOD BY AUTOMATED COUNT: 14 % (ref 11.5–14.5)
GLUCOSE SERPL-MCNC: 128 MG/DL (ref 74–99)
HCT VFR BLD AUTO: 43.4 % (ref 36–46)
HGB BLD-MCNC: 14.1 G/DL (ref 12–16)
IMM GRANULOCYTES # BLD AUTO: 0.03 X10*3/UL (ref 0–0.7)
IMM GRANULOCYTES NFR BLD AUTO: 0.2 % (ref 0–0.9)
LYMPHOCYTES # BLD AUTO: 4.4 X10*3/UL (ref 1.2–4.8)
LYMPHOCYTES NFR BLD AUTO: 33.1 %
MCH RBC QN AUTO: 29.3 PG (ref 26–34)
MCHC RBC AUTO-ENTMCNC: 32.5 G/DL (ref 32–36)
MCV RBC AUTO: 90 FL (ref 80–100)
MONOCYTES # BLD AUTO: 0.74 X10*3/UL (ref 0.1–1)
MONOCYTES NFR BLD AUTO: 5.6 %
NEUTROPHILS # BLD AUTO: 7.85 X10*3/UL (ref 1.2–7.7)
NEUTROPHILS NFR BLD AUTO: 59 %
NRBC BLD-RTO: ABNORMAL /100{WBCS}
PLATELET # BLD AUTO: 297 X10*3/UL (ref 150–450)
POTASSIUM SERPL-SCNC: 4.4 MMOL/L (ref 3.5–5.3)
PROT SERPL-MCNC: 7.6 G/DL (ref 6.4–8.2)
RBC # BLD AUTO: 4.81 X10*6/UL (ref 4–5.2)
SODIUM SERPL-SCNC: 138 MMOL/L (ref 136–145)
WBC # BLD AUTO: 13.3 X10*3/UL (ref 4.4–11.3)

## 2024-08-01 PROCEDURE — 82378 CARCINOEMBRYONIC ANTIGEN: CPT

## 2024-08-01 PROCEDURE — 71250 CT THORAX DX C-: CPT | Performed by: RADIOLOGY

## 2024-08-01 PROCEDURE — 85025 COMPLETE CBC W/AUTO DIFF WBC: CPT

## 2024-08-01 PROCEDURE — 71250 CT THORAX DX C-: CPT

## 2024-08-01 PROCEDURE — 80053 COMPREHEN METABOLIC PANEL: CPT

## 2024-08-01 PROCEDURE — 36415 COLL VENOUS BLD VENIPUNCTURE: CPT

## 2024-08-05 ENCOUNTER — OFFICE VISIT (OUTPATIENT)
Dept: HEMATOLOGY/ONCOLOGY | Facility: CLINIC | Age: 69
End: 2024-08-05
Payer: MEDICARE

## 2024-08-05 DIAGNOSIS — C18.9 COLON CANCER METASTASIZED TO LIVER (MULTI): ICD-10-CM

## 2024-08-05 DIAGNOSIS — C78.7 COLON CANCER METASTASIZED TO LIVER (MULTI): ICD-10-CM

## 2024-08-05 PROCEDURE — 99214 OFFICE O/P EST MOD 30 MIN: CPT | Performed by: INTERNAL MEDICINE

## 2024-08-05 NOTE — PROGRESS NOTES
Patient ID: Mabel Cardoso is a 69 y.o. female from [place], [place].     Referring Physician: Steven Campbell MD  [id number] [place] [address]    Primary Care Provider: Donna Leos    Cancer History:   Treatment Synopsis:    DIAGNOSIS  5/18/19 T3N0M0 right-sided colon cancer, s/p resection  11/2019 Liver metastasis bx proven  6/2020- LIver bx of smaller lesion negative  6/30/2020 R lobe hepatectomy     CURRENT SITES OF DISEASE  Liver- > BECKIE      MOLECULAR GENOMICS:  MICROSATELLITE STATUS: Indeterminate   DISEASE ASSOCIATED GENOMIC FINDINGS:   TP53 p.R248W ([id number]: c.742C>T)       DISEASERELEVANT ALTERATIONS NOT DETECTED:    Negative for BRAF V600E.   Negative for KRAS mutation.   Negative for NRAS mutation.        SERUM TUMOR MARKER  CEA low at dx  6.9 at recurrence 10/2019  3.0 1/2020     CURRENT THERAPY  Surveillance    PRIOR THERAPY  R. Hemicolectomy 5/2018  Developed liver met 11/19: 12/19-4/2020cape/ox, bevacizumab - oxaliplatin / jarad held starting 4/28/20 -      Liver resection 6/30/20     Past Medical History: Mabel has a past medical history of Other specified health status, Personal history of other diseases of the circulatory system (07/15/2020), and Personal history of other endocrine, nutritional and metabolic disease (07/15/2020).  Surgical History:  Mabel has a past surgical history that includes Breast surgery (04/09/2018); Hysterectomy (04/09/2018); Knee surgery (04/09/2018); Other surgical history (07/15/2020); US guided needle liver biopsy (10/18/2019); and US guided needle liver biopsy (6/5/2020).  Social History:  Mabel reports that she has never smoked. She has never used smokeless tobacco. She reports that she does not currently use alcohol. She reports that she does not use drugs.  Family History:    Family History   Problem Relation Name Age of Onset    Diabetes Mother      Hypertension Mother      Lung cancer Mother      Lung cancer Father       Family Oncology  History:  Cancer-related family history includes Lung cancer in her father and mother.        SUBJECTIVE:    History of Present Illness:  Mabel Cardoso is a 69 y.o. female who was referred by Steven aCmpbell MD and presents with No chief complaint on file.    Feeling good.    Down 10 lbs on ozempic in last 6months.    She is wondering about if she can come of Farxiga.    OBJECTIVE:    VS / Pain:  There were no vitals taken for this visit.  BSA: There is no height or weight on file to calculate BSA.   Pain Scale: 0    Daily Weight  05/07/24 : 115 kg (252 lb 6.8 oz)  11/07/23 : 113 kg (249 lb 12.5 oz)  03/03/23 : 117 kg (258 lb 1.6 oz)      Physical Exam  Constitutional:       General: She is not in acute distress.     Appearance: She is obese.   HENT:      Head: Normocephalic.      Mouth/Throat:      Mouth: Mucous membranes are moist.   Eyes:      Pupils: Pupils are equal, round, and reactive to light.   Cardiovascular:      Rate and Rhythm: Normal rate.   Pulmonary:      Effort: Pulmonary effort is normal.   Abdominal:      General: Abdomen is flat.   Musculoskeletal:         General: Normal range of motion.      Cervical back: Normal range of motion.   Skin:     General: Skin is warm.   Neurological:      General: No focal deficit present.      Mental Status: She is alert. Mental status is at baseline.   Psychiatric:         Mood and Affect: Mood normal.         Thought Content: Thought content normal.       Performance Status:   Ecog 1    Diagnostic Results         WBC   Date/Time Value Ref Range Status   08/01/2024 10:10 AM 13.3 (H) 4.4 - 11.3 x10*3/uL Final   05/06/2024 04:04 PM 12.0 (H) 4.4 - 11.3 x10*3/uL Final   11/02/2023 10:27 AM 11.6 (H) 4.4 - 11.3 x10*3/uL Final     Hemoglobin   Date Value Ref Range Status   08/01/2024 14.1 12.0 - 16.0 g/dL Final   05/06/2024 13.4 12.0 - 16.0 g/dL Final   11/02/2023 13.8 12.0 - 16.0 g/dL Final     MCV   Date/Time Value Ref Range Status   08/01/2024 10:10 AM 90 80 -  100 fL Final   05/06/2024 04:04 PM 93 80 - 100 fL Final   11/02/2023 10:27 AM 94 80 - 100 fL Final     Platelets   Date/Time Value Ref Range Status   08/01/2024 10:10  150 - 450 x10*3/uL Final   05/06/2024 04:04  150 - 450 x10*3/uL Final     Comment:     Platelet count verified by smear review.   11/02/2023 10:27  150 - 450 x10*3/uL Final     Neutrophils Absolute   Date/Time Value Ref Range Status   08/01/2024 10:10 AM 7.85 (H) 1.20 - 7.70 x10*3/uL Final     Comment:     Percent differential counts (%) should be interpreted in the context of the absolute cell counts (cells/uL).   05/06/2024 04:04 PM 6.58 1.20 - 7.70 x10*3/uL Final     Comment:     Percent differential counts (%) should be interpreted in the context of the absolute cell counts (cells/uL).   11/02/2023 10:27 AM 6.06 1.20 - 7.70 x10*3/uL Final     Comment:     Percent differential counts (%) should be interpreted in the context of the absolute cell counts (cells/uL).     Bilirubin, Total   Date/Time Value Ref Range Status   08/01/2024 10:10 AM 0.7 0.0 - 1.2 mg/dL Final   05/06/2024 04:04 PM 0.7 0.0 - 1.2 mg/dL Final   11/02/2023 10:27 AM 0.5 0.0 - 1.2 mg/dL Final     AST   Date/Time Value Ref Range Status   08/01/2024 10:10 AM 19 9 - 39 U/L Final   05/06/2024 04:04 PM 22 9 - 39 U/L Final   11/02/2023 10:27 AM 18 9 - 39 U/L Final     ALT   Date/Time Value Ref Range Status   08/01/2024 10:10 AM 18 7 - 45 U/L Final     Comment:     Patients treated with Sulfasalazine may generate falsely decreased results for ALT.   05/06/2024 04:04 PM 29 7 - 45 U/L Final     Comment:     Patients treated with Sulfasalazine may generate falsely decreased results for ALT.   11/02/2023 10:27 AM 20 7 - 45 U/L Final     Comment:     Patients treated with Sulfasalazine may generate falsely decreased results for ALT.     Creatinine   Date/Time Value Ref Range Status   08/01/2024 10:10 AM 0.58 0.50 - 1.05 mg/dL Final   05/06/2024 04:04 PM 0.76 0.50 - 1.05 mg/dL  "Final   11/02/2023 10:27 AM 0.51 0.50 - 1.05 mg/dL Final     Urea Nitrogen   Date/Time Value Ref Range Status   08/01/2024 10:10 AM 10 6 - 23 mg/dL Final   05/06/2024 04:04 PM 8 6 - 23 mg/dL Final   11/02/2023 10:27 AM 11 6 - 23 mg/dL Final     Albumin   Date/Time Value Ref Range Status   08/01/2024 10:10 AM 4.4 3.4 - 5.0 g/dL Final   05/06/2024 04:04 PM 4.1 3.4 - 5.0 g/dL Final   11/02/2023 10:27 AM 4.3 3.4 - 5.0 g/dL Final     No results found for: \"\"  Carcinoembryonic AG   Date/Time Value Ref Range Status   08/01/2024 10:10 AM <0.5 ug/L Final   05/06/2024 04:04 PM <0.5 ug/L Final   11/02/2023 10:27 AM 0.5 ug/L Final   === 11/02/23 ===    CT CHEST ABDOMEN PELVIS W IV CONTRAST    - Impression -  CHEST:  1. No intrathoracic metastatic disease.    ABDOMEN-PELVIS:  1. Status post right hemicolectomy with no locoregional recurrence at  the ileocolic anastomosis or metastatic disease in the abdomen or  pelvis.  2. Status post partial right hepatectomy with no local recurrence.  3. Hepatic steatosis.      MACRO:  None    Signed by: Lars Mccrary 11/3/2023 1:50 PM  Dictation workstation:   [id number]     CT scan from May 6, 2024 not yet read by radiology however I have reviewed the images completely.  There is a new 2 to 3 mm pulmonary nodule in the left lower lobe.  There is an associated 1 mm punctate nodule adjacent to the 2 to 3 mm nodule.  These appear consistent with inflammatory changes although are too small to completely characterize and should be followed up with short interval imaging in 3 months.    Assessment/Plan     Assessment:    Ms. Cardoso is a 69 y.o.  F with Stage II (T3N0M0) tumor with no high-risk features, s/p resection 5/3/18. She had a complicated post-op course due to prolonged ileus,  requiring NGT and PICC. She did not receive adjuvant therapy, and she subsequently developed SBO and hernia s/p OR on 11/4/18 for lysis of adhesio and internal hernia repair.      Followed by surveillance  - " found to have a rising cea level Sept 2019 - 4.9.  Repeated on 10/1/19 - was again elevated at 6.8.  10/9/19 - ct scan showed 2 liver lesions c/w metastatic disease.   Bx + for adenocarcinoma cw colon primary.      11/5/19 - started chemotherapy with capeox.     11/26/19 - avastin added with cycle #2.      1/2020 scans show response in liver.     4/21/2020 - ct scans showing stable disease - referred to Dr. Mendes for surgical resection  - he discussed ablation followed by possible right hepatectomy.  6/5/2020- IR consult for ablation, lesion planned for ablation bx negative for cancer, fat only.    6/30/20- R lobe hepatectomy.  METASTATIC COLONIC ADENOCARCINOMA INVOLVING LIVER --BACKGROUND LIVER WITH MODERATE STEATOSIS Note: The resection margin is free of tumor.    - followed by surveillance since that time. Doing well.  CEA normal - no concerning symptoms.   1/2022, 9/2022, 10/2023 CT scan negative for recurrence/metastasis.        Plan:   Metastatic CRC now BECKIE s/p R liver hepatectomy 6/2020.  -   Continue with Surveillance regimen    - Port removed.   - next colonoscopy due May 2025   - Recommended exercise 30 min per day 5 days per week.    - New L side pulm nodule will need early interval CT of chest in 3 months 8/2024.  Resolved but similar lesion on other side.      - Full CT C/a/P  ~11/12024 or so.       Weight loss:  -Planned, on ozempic     Hypercalcemia:  -Check PTH     Hypertension:  - F/U with PCP Dr. Donna Leos ([facility])     Social -    -- referral to [employer] work for billing, reimbursement issues, following with Selam Campbell MD

## 2024-08-06 ENCOUNTER — APPOINTMENT (OUTPATIENT)
Dept: HEMATOLOGY/ONCOLOGY | Facility: CLINIC | Age: 69
End: 2024-08-06
Payer: MEDICARE

## 2024-10-29 ENCOUNTER — APPOINTMENT (OUTPATIENT)
Dept: RADIOLOGY | Facility: CLINIC | Age: 69
End: 2024-10-29
Payer: MEDICARE

## 2024-11-05 ENCOUNTER — APPOINTMENT (OUTPATIENT)
Dept: HEMATOLOGY/ONCOLOGY | Facility: CLINIC | Age: 69
End: 2024-11-05
Payer: MEDICARE

## 2024-11-11 DIAGNOSIS — C78.7 COLON CANCER METASTASIZED TO LIVER (MULTI): ICD-10-CM

## 2024-11-11 DIAGNOSIS — C18.9 COLON CANCER METASTASIZED TO LIVER (MULTI): ICD-10-CM

## 2024-11-20 ENCOUNTER — LAB (OUTPATIENT)
Dept: LAB | Facility: CLINIC | Age: 69
End: 2024-11-20
Payer: MEDICARE

## 2024-11-20 DIAGNOSIS — C18.9 COLON CANCER METASTASIZED TO LIVER (MULTI): ICD-10-CM

## 2024-11-20 DIAGNOSIS — C78.7 COLON CANCER METASTASIZED TO LIVER (MULTI): ICD-10-CM

## 2024-11-20 LAB
ALBUMIN SERPL BCP-MCNC: 4.6 G/DL (ref 3.4–5)
ALP SERPL-CCNC: 148 U/L (ref 33–136)
ALT SERPL W P-5'-P-CCNC: 19 U/L (ref 7–45)
ANION GAP SERPL CALC-SCNC: 16 MMOL/L (ref 10–20)
AST SERPL W P-5'-P-CCNC: 14 U/L (ref 9–39)
BASOPHILS # BLD AUTO: 0.03 X10*3/UL (ref 0–0.1)
BASOPHILS NFR BLD AUTO: 0.3 %
BILIRUB SERPL-MCNC: 0.8 MG/DL (ref 0–1.2)
BUN SERPL-MCNC: 12 MG/DL (ref 6–23)
CALCIUM SERPL-MCNC: 10.2 MG/DL (ref 8.6–10.6)
CEA SERPL-MCNC: <0.5 UG/L
CHLORIDE SERPL-SCNC: 103 MMOL/L (ref 98–107)
CO2 SERPL-SCNC: 25 MMOL/L (ref 21–32)
CREAT SERPL-MCNC: 0.8 MG/DL (ref 0.5–1.05)
CREAT SERPL-MCNC: 0.9 MG/DL (ref 0.6–1.3)
EGFRCR SERPLBLD CKD-EPI 2021: 80 ML/MIN/1.73M*2
EOSINOPHIL # BLD AUTO: 0.2 X10*3/UL (ref 0–0.7)
EOSINOPHIL NFR BLD AUTO: 1.7 %
ERYTHROCYTE [DISTWIDTH] IN BLOOD BY AUTOMATED COUNT: 14.1 % (ref 11.5–14.5)
GFR SERPL CREATININE-BSD FRML MDRD: 70 ML/MIN/1.73M*2
GLUCOSE SERPL-MCNC: 154 MG/DL (ref 74–99)
HCT VFR BLD AUTO: 42.4 % (ref 36–46)
HGB BLD-MCNC: 13.9 G/DL (ref 12–16)
IMM GRANULOCYTES # BLD AUTO: 0.03 X10*3/UL (ref 0–0.7)
IMM GRANULOCYTES NFR BLD AUTO: 0.3 % (ref 0–0.9)
LYMPHOCYTES # BLD AUTO: 4.18 X10*3/UL (ref 1.2–4.8)
LYMPHOCYTES NFR BLD AUTO: 35.2 %
MCH RBC QN AUTO: 29.7 PG (ref 26–34)
MCHC RBC AUTO-ENTMCNC: 32.8 G/DL (ref 32–36)
MCV RBC AUTO: 91 FL (ref 80–100)
MONOCYTES # BLD AUTO: 0.82 X10*3/UL (ref 0.1–1)
MONOCYTES NFR BLD AUTO: 6.9 %
NEUTROPHILS # BLD AUTO: 6.6 X10*3/UL (ref 1.2–7.7)
NEUTROPHILS NFR BLD AUTO: 55.6 %
NRBC BLD-RTO: ABNORMAL /100{WBCS}
PLATELET # BLD AUTO: 312 X10*3/UL (ref 150–450)
POTASSIUM SERPL-SCNC: 4.1 MMOL/L (ref 3.5–5.3)
PROT SERPL-MCNC: 7.5 G/DL (ref 6.4–8.2)
RBC # BLD AUTO: 4.68 X10*6/UL (ref 4–5.2)
SODIUM SERPL-SCNC: 140 MMOL/L (ref 136–145)
WBC # BLD AUTO: 11.9 X10*3/UL (ref 4.4–11.3)

## 2024-11-20 PROCEDURE — 82565 ASSAY OF CREATININE: CPT | Mod: 59

## 2024-11-20 PROCEDURE — 36415 COLL VENOUS BLD VENIPUNCTURE: CPT

## 2024-11-20 PROCEDURE — 82565 ASSAY OF CREATININE: CPT

## 2024-11-20 PROCEDURE — 82378 CARCINOEMBRYONIC ANTIGEN: CPT

## 2024-11-20 PROCEDURE — 85025 COMPLETE CBC W/AUTO DIFF WBC: CPT

## 2024-11-22 ENCOUNTER — HOSPITAL ENCOUNTER (OUTPATIENT)
Dept: RADIOLOGY | Facility: CLINIC | Age: 69
Discharge: HOME | End: 2024-11-22
Payer: MEDICARE

## 2024-11-22 DIAGNOSIS — C78.7 COLON CANCER METASTASIZED TO LIVER (MULTI): ICD-10-CM

## 2024-11-22 DIAGNOSIS — C18.9 COLON CANCER METASTASIZED TO LIVER (MULTI): ICD-10-CM

## 2024-11-22 PROCEDURE — 74177 CT ABD & PELVIS W/CONTRAST: CPT

## 2024-11-22 PROCEDURE — 2550000001 HC RX 255 CONTRASTS: Performed by: INTERNAL MEDICINE

## 2024-12-03 ENCOUNTER — OFFICE VISIT (OUTPATIENT)
Dept: HEMATOLOGY/ONCOLOGY | Facility: CLINIC | Age: 69
End: 2024-12-03
Payer: MEDICARE

## 2024-12-03 VITALS
RESPIRATION RATE: 18 BRPM | WEIGHT: 259.48 LBS | SYSTOLIC BLOOD PRESSURE: 145 MMHG | TEMPERATURE: 97.3 F | HEART RATE: 70 BPM | DIASTOLIC BLOOD PRESSURE: 85 MMHG | BODY MASS INDEX: 43.18 KG/M2 | OXYGEN SATURATION: 94 %

## 2024-12-03 DIAGNOSIS — C18.9 COLON CANCER METASTASIZED TO LIVER (MULTI): ICD-10-CM

## 2024-12-03 DIAGNOSIS — C78.7 COLON CANCER METASTASIZED TO LIVER (MULTI): ICD-10-CM

## 2024-12-03 PROCEDURE — 99213 OFFICE O/P EST LOW 20 MIN: CPT | Performed by: INTERNAL MEDICINE

## 2024-12-03 PROCEDURE — 1036F TOBACCO NON-USER: CPT | Performed by: INTERNAL MEDICINE

## 2024-12-03 PROCEDURE — 1159F MED LIST DOCD IN RCRD: CPT | Performed by: INTERNAL MEDICINE

## 2024-12-03 PROCEDURE — 1126F AMNT PAIN NOTED NONE PRSNT: CPT | Performed by: INTERNAL MEDICINE

## 2024-12-03 ASSESSMENT — COLUMBIA-SUICIDE SEVERITY RATING SCALE - C-SSRS
2. HAVE YOU ACTUALLY HAD ANY THOUGHTS OF KILLING YOURSELF?: NO
1. IN THE PAST MONTH, HAVE YOU WISHED YOU WERE DEAD OR WISHED YOU COULD GO TO SLEEP AND NOT WAKE UP?: NO
6. HAVE YOU EVER DONE ANYTHING, STARTED TO DO ANYTHING, OR PREPARED TO DO ANYTHING TO END YOUR LIFE?: NO

## 2024-12-03 ASSESSMENT — ENCOUNTER SYMPTOMS
OCCASIONAL FEELINGS OF UNSTEADINESS: 0
LOSS OF SENSATION IN FEET: 0
DEPRESSION: 0

## 2024-12-03 ASSESSMENT — PATIENT HEALTH QUESTIONNAIRE - PHQ9
2. FEELING DOWN, DEPRESSED OR HOPELESS: NOT AT ALL
SUM OF ALL RESPONSES TO PHQ9 QUESTIONS 1 AND 2: 0
1. LITTLE INTEREST OR PLEASURE IN DOING THINGS: NOT AT ALL

## 2024-12-03 ASSESSMENT — PAIN SCALES - GENERAL: PAINLEVEL_OUTOF10: 0-NO PAIN

## 2024-12-03 NOTE — PROGRESS NOTES
Patient ID: Mabel Cardoso is a 69 y.o. female from [place], [place].     Referring Physician: Steven Campbell MD  [id number] [place] [address]    Primary Care Provider: Donna Leos    Cancer History:   Treatment Synopsis:    DIAGNOSIS  5/18/19 T3N0M0 right-sided colon cancer, s/p resection  11/2019 Liver metastasis bx proven  6/2020- LIver bx of smaller lesion negative  6/30/2020 R lobe hepatectomy     CURRENT SITES OF DISEASE  Liver- > BECKIE      MOLECULAR GENOMICS:  MICROSATELLITE STATUS: Indeterminate   DISEASE ASSOCIATED GENOMIC FINDINGS:   TP53 p.R248W ([id number]: c.742C>T)       DISEASERELEVANT ALTERATIONS NOT DETECTED:    Negative for BRAF V600E.   Negative for KRAS mutation.   Negative for NRAS mutation.        SERUM TUMOR MARKER  CEA low at dx  6.9 at recurrence 10/2019  3.0 1/2020     CURRENT THERAPY  Surveillance    PRIOR THERAPY  R. Hemicolectomy 5/2018  Developed liver met 11/19: 12/19-4/2020cape/ox, bevacizumab - oxaliplatin / jarad held starting 4/28/20 -      Liver resection 6/30/20     Past Medical History: Mabel has a past medical history of Other specified health status, Personal history of other diseases of the circulatory system (07/15/2020), and Personal history of other endocrine, nutritional and metabolic disease (07/15/2020).  Surgical History:  Mabel has a past surgical history that includes Breast surgery (04/09/2018); Hysterectomy (04/09/2018); Knee surgery (04/09/2018); Other surgical history (07/15/2020); US guided needle liver biopsy (10/18/2019); and US guided needle liver biopsy (6/5/2020).  Social History:  Mabel reports that she has never smoked. She has never used smokeless tobacco. She reports that she does not currently use alcohol. She reports that she does not use drugs.  Family History:    Family History   Problem Relation Name Age of Onset    Diabetes Mother      Hypertension Mother      Lung cancer Mother      Lung cancer Father       Family Oncology  History:  Cancer-related family history includes Lung cancer in her father and mother.        SUBJECTIVE:    History of Present Illness:  Mabel Cardoso is a 69 y.o. female who was referred by Steven Campbell MD and presents with Follow-up (Fuv )    Feeling good.    Down 10 lbs on ozempic in last 6months.    She is wondering about if she can come of Farxiga.    OBJECTIVE:    VS / Pain:  /85   Pulse 70   Temp 36.3 °C (97.3 °F)   Resp 18   Wt 118 kg (259 lb 7.7 oz)   SpO2 94%   BMI 43.18 kg/m²   BSA: 2.33 meters squared   Pain Scale: 0    Daily Weight  12/03/24 : 118 kg (259 lb 7.7 oz)  05/07/24 : 115 kg (252 lb 6.8 oz)  11/07/23 : 113 kg (249 lb 12.5 oz)      Physical Exam  Constitutional:       General: She is not in acute distress.     Appearance: She is obese.   HENT:      Head: Normocephalic.      Mouth/Throat:      Mouth: Mucous membranes are moist.   Eyes:      Pupils: Pupils are equal, round, and reactive to light.   Cardiovascular:      Rate and Rhythm: Normal rate.   Pulmonary:      Effort: Pulmonary effort is normal.   Abdominal:      General: Abdomen is flat.   Musculoskeletal:         General: Normal range of motion.      Cervical back: Normal range of motion.   Skin:     General: Skin is warm.   Neurological:      General: No focal deficit present.      Mental Status: She is alert. Mental status is at baseline.   Psychiatric:         Mood and Affect: Mood normal.         Thought Content: Thought content normal.       Performance Status:   Ecog 1    Diagnostic Results         WBC   Date/Time Value Ref Range Status   11/20/2024 12:11 PM 11.9 (H) 4.4 - 11.3 x10*3/uL Final   08/01/2024 10:10 AM 13.3 (H) 4.4 - 11.3 x10*3/uL Final   05/06/2024 04:04 PM 12.0 (H) 4.4 - 11.3 x10*3/uL Final     Hemoglobin   Date Value Ref Range Status   11/20/2024 13.9 12.0 - 16.0 g/dL Final   08/01/2024 14.1 12.0 - 16.0 g/dL Final   05/06/2024 13.4 12.0 - 16.0 g/dL Final     MCV   Date/Time Value Ref Range Status    11/20/2024 12:11 PM 91 80 - 100 fL Final   08/01/2024 10:10 AM 90 80 - 100 fL Final   05/06/2024 04:04 PM 93 80 - 100 fL Final     Platelets   Date/Time Value Ref Range Status   11/20/2024 12:11  150 - 450 x10*3/uL Final   08/01/2024 10:10  150 - 450 x10*3/uL Final   05/06/2024 04:04  150 - 450 x10*3/uL Final     Comment:     Platelet count verified by smear review.     Neutrophils Absolute   Date/Time Value Ref Range Status   11/20/2024 12:11 PM 6.60 1.20 - 7.70 x10*3/uL Final     Comment:     Percent differential counts (%) should be interpreted in the context of the absolute cell counts (cells/uL).   08/01/2024 10:10 AM 7.85 (H) 1.20 - 7.70 x10*3/uL Final     Comment:     Percent differential counts (%) should be interpreted in the context of the absolute cell counts (cells/uL).   05/06/2024 04:04 PM 6.58 1.20 - 7.70 x10*3/uL Final     Comment:     Percent differential counts (%) should be interpreted in the context of the absolute cell counts (cells/uL).     Bilirubin, Total   Date/Time Value Ref Range Status   11/20/2024 12:11 PM 0.8 0.0 - 1.2 mg/dL Final   08/01/2024 10:10 AM 0.7 0.0 - 1.2 mg/dL Final   05/06/2024 04:04 PM 0.7 0.0 - 1.2 mg/dL Final     AST   Date/Time Value Ref Range Status   11/20/2024 12:11 PM 14 9 - 39 U/L Final   08/01/2024 10:10 AM 19 9 - 39 U/L Final   05/06/2024 04:04 PM 22 9 - 39 U/L Final     ALT   Date/Time Value Ref Range Status   11/20/2024 12:11 PM 19 7 - 45 U/L Final     Comment:     Patients treated with Sulfasalazine may generate falsely decreased results for ALT.   08/01/2024 10:10 AM 18 7 - 45 U/L Final     Comment:     Patients treated with Sulfasalazine may generate falsely decreased results for ALT.   05/06/2024 04:04 PM 29 7 - 45 U/L Final     Comment:     Patients treated with Sulfasalazine may generate falsely decreased results for ALT.     Creatinine   Date/Time Value Ref Range Status   11/20/2024 12:11 PM 0.80 0.50 - 1.05 mg/dL Final   08/01/2024  "10:10 AM 0.58 0.50 - 1.05 mg/dL Final   05/06/2024 04:04 PM 0.76 0.50 - 1.05 mg/dL Final     Urea Nitrogen   Date/Time Value Ref Range Status   11/20/2024 12:11 PM 12 6 - 23 mg/dL Final   08/01/2024 10:10 AM 10 6 - 23 mg/dL Final   05/06/2024 04:04 PM 8 6 - 23 mg/dL Final     Albumin   Date/Time Value Ref Range Status   11/20/2024 12:11 PM 4.6 3.4 - 5.0 g/dL Final   08/01/2024 10:10 AM 4.4 3.4 - 5.0 g/dL Final   05/06/2024 04:04 PM 4.1 3.4 - 5.0 g/dL Final     No results found for: \"\"  Carcinoembryonic AG   Date/Time Value Ref Range Status   11/20/2024 12:11 PM <0.5 ug/L Final   08/01/2024 10:10 AM <0.5 ug/L Final   05/06/2024 04:04 PM <0.5 ug/L Final     === 11/22/24 ===    CT CHEST ABDOMEN PELVIS W IV CONTRAST    - Impression -  No interval developing metastatic disease is detected.      MACRO:  None.    Signed by: Sandra Lucas 11/25/2024 10:57 AM  Dictation workstation:   [id number]      Assessment/Plan     Assessment:    Ms. Cardoso is a 69 y.o.  F with Stage II (T3N0M0) tumor with no high-risk features, s/p resection 5/3/18. She had a complicated post-op course due to prolonged ileus,  requiring NGT and PICC. She did not receive adjuvant therapy, and she subsequently developed SBO and hernia s/p OR on 11/4/18 for lysis of adhesio and internal hernia repair.      Followed by surveillance  - found to have a rising cea level Sept 2019 - 4.9.  Repeated on 10/1/19 - was again elevated at 6.8.  10/9/19 - ct scan showed 2 liver lesions c/w metastatic disease.   Bx + for adenocarcinoma cw colon primary.      11/5/19 - started chemotherapy with capeox.     11/26/19 - avastin added with cycle #2.      1/2020 scans show response in liver.     4/21/2020 - ct scans showing stable disease - referred to Dr. Mendes for surgical resection  - he discussed ablation followed by possible right hepatectomy.  6/5/2020- IR consult for ablation, lesion planned for ablation bx negative for cancer, fat only.    6/30/20- R lobe " hepatectomy.  METASTATIC COLONIC ADENOCARCINOMA INVOLVING LIVER --BACKGROUND LIVER WITH MODERATE STEATOSIS Note: The resection margin is free of tumor.    - followed by surveillance since that time. Doing well.  CEA normal - no concerning symptoms.   1/2022, 9/2022, 10/2023 CT scan negative for recurrence/metastasis.        Plan:   Metastatic CRC now BECKIE s/p R liver hepatectomy 6/2020.  -   Continue with Surveillance regimen    - Port removed.   - next colonoscopy due May 2025   - Recommended exercise 30 min per day 5 days per week.    - New L side pulm nodule will need early interval CT of chest in 3 months 8/2024.  Resolved but similar lesion on other side.  No pulm nodules seen in 11/2024 scans.     Plan follow up in 6 months.      Weight loss:  -Planned, on ozempic     Hypercalcemia:  -Check PTH     Hypertension:  - F/U with PCP Dr. Donna Leos ([facility])     Social -    -- referral to social work for billing, reimbursement issues, following with Selam Campbell MD

## 2024-12-16 ENCOUNTER — TELEPHONE (OUTPATIENT)
Dept: GASTROENTEROLOGY | Facility: HOSPITAL | Age: 69
End: 2024-12-16
Payer: MEDICARE

## 2024-12-16 NOTE — TELEPHONE ENCOUNTER
Call made to patient to assist with scheduling for colonoscopy. There was no answer, voicemail was left.

## 2024-12-19 DIAGNOSIS — C18.9 COLON CANCER METASTASIZED TO LIVER (MULTI): Primary | ICD-10-CM

## 2024-12-19 DIAGNOSIS — C78.7 COLON CANCER METASTASIZED TO LIVER (MULTI): Primary | ICD-10-CM

## 2024-12-19 RX ORDER — POLYETHYLENE GLYCOL 3350, SODIUM CHLORIDE, SODIUM BICARBONATE, POTASSIUM CHLORIDE 420; 11.2; 5.72; 1.48 G/4L; G/4L; G/4L; G/4L
4000 POWDER, FOR SOLUTION ORAL ONCE
Qty: 4000 ML | Refills: 0 | Status: SHIPPED | OUTPATIENT
Start: 2024-12-19 | End: 2024-12-19

## 2025-05-06 ENCOUNTER — APPOINTMENT (OUTPATIENT)
Dept: HEMATOLOGY/ONCOLOGY | Facility: CLINIC | Age: 70
End: 2025-05-06
Payer: MEDICARE

## 2025-05-07 ENCOUNTER — TELEPHONE (OUTPATIENT)
Dept: HEMATOLOGY/ONCOLOGY | Facility: HOSPITAL | Age: 70
End: 2025-05-07
Payer: MEDICARE

## 2025-05-07 NOTE — TELEPHONE ENCOUNTER
"Mabel calls to inquire why she needs a colonoscopy and a CT scan on the next day. She questions the necessity of the scan \"when the colonoscopy will show everything you need to know.\" Advised that I expect Dr. Campbell needs her to keep the appointment for the scan but that I will send this message to the team.  "

## 2025-05-07 NOTE — TELEPHONE ENCOUNTER
Contacted and spoke with Mabel regarding her CT scan.   I explained that the colonoscopy will only show us inside the colon, but the CT will show us throughout her body. I told her this it is essential for Dr. Campbell to have all of the puzzle pieces for the surveillance of her cancer.   She confirmed time and date of CT and verbalized understanding/agreement.

## 2025-05-24 DIAGNOSIS — C18.9 COLON CANCER METASTASIZED TO LIVER (MULTI): Primary | ICD-10-CM

## 2025-05-24 DIAGNOSIS — C78.7 COLON CANCER METASTASIZED TO LIVER (MULTI): Primary | ICD-10-CM

## 2025-05-24 RX ORDER — POLYETHYLENE GLYCOL 3350, SODIUM SULFATE ANHYDROUS, SODIUM BICARBONATE, SODIUM CHLORIDE, POTASSIUM CHLORIDE 236; 22.74; 6.74; 5.86; 2.97 G/4L; G/4L; G/4L; G/4L; G/4L
4 POWDER, FOR SOLUTION ORAL ONCE
Qty: 4000 ML | Refills: 0 | Status: SHIPPED | OUTPATIENT
Start: 2025-05-24 | End: 2025-05-24